# Patient Record
Sex: FEMALE | Race: WHITE
[De-identification: names, ages, dates, MRNs, and addresses within clinical notes are randomized per-mention and may not be internally consistent; named-entity substitution may affect disease eponyms.]

---

## 2017-06-11 ENCOUNTER — HOSPITAL ENCOUNTER (EMERGENCY)
Dept: HOSPITAL 10 - FTE | Age: 52
Discharge: HOME | End: 2017-06-11
Payer: MEDICARE

## 2017-06-11 VITALS
SYSTOLIC BLOOD PRESSURE: 122 MMHG | TEMPERATURE: 98 F | DIASTOLIC BLOOD PRESSURE: 73 MMHG | HEART RATE: 108 BPM | RESPIRATION RATE: 18 BRPM

## 2017-06-11 VITALS — HEIGHT: 55 IN | WEIGHT: 224.47 LBS | BODY MASS INDEX: 51.95 KG/M2

## 2017-06-11 DIAGNOSIS — R07.9: ICD-10-CM

## 2017-06-11 DIAGNOSIS — J45.901: Primary | ICD-10-CM

## 2017-06-11 DIAGNOSIS — J06.9: ICD-10-CM

## 2017-06-11 PROCEDURE — 94664 DEMO&/EVAL PT USE INHALER: CPT

## 2017-06-11 PROCEDURE — 93005 ELECTROCARDIOGRAM TRACING: CPT

## 2017-06-11 PROCEDURE — 99284 EMERGENCY DEPT VISIT MOD MDM: CPT

## 2017-06-11 PROCEDURE — 71010: CPT

## 2017-06-11 NOTE — RADRPT
PROCEDURE:   XR Chest. 

 

CLINICAL INDICATION:    chest pain

 

TECHNIQUE:   Single frontal view of the chest was obtained 

 

COMPARISON:   None 

 

FINDINGS:

The heart and mediastinum are within normal limits.  

The lungs are clear.

There is no pleural effusion or pneumothorax.   

 

RPTAT: AA

 

IMPRESSION:

No acute disease.

_____________________________________________ 

.Eleuterio Garcia MD, MD           Date    Time 

Electronically viewed and signed by .Eleuterio Garcia MD, MD on 06/11/2017 15:47 

 

D:  06/11/2017 15:47  T:  06/11/2017 15:47

.S/

## 2017-06-11 NOTE — ERD
ER Documentation


Chief Complaint


Date/Time


DATE: 17 


TIME: 16:33


Chief Complaint


cough w chest tightness, states "bronchitis"





HPI


Patient is a 51-year-old female with history of asthma who presents to the 

emergency department for concerns of a cough with chest tightness.  Patient 

states her symptoms started yesterday.  Patient states she believes she has 

"bronchitis" because her mother has bronchitis at this time.  Patient states 

when she coughs she feels as if "I am choking".  Patient reports a dry cough 

with occasional clear sputum production. Patient does report some clear 

rhinorrhea.  She reports using her inhaler 1.5 hours prior to arrival.  Patient 

is a smoker.  Patient denies any fevers, chills, nausea, vomiting or LOC.  

Patient denies any chest pain, left upper extremity pain or diaphoresis.  

Patient is able to tolerate p.o. fluids without any difficulty.  Patient's 

granddaughter is also sick contact, she currently has a cold.





ROS


All systems reviewed and are negative except as per history of present illness.





Medications


Home Meds


Active Scripts


Guaifenesin* (Robitussin*) 100 Mg/5 Ml Syrup, 100 MG PO Q4H Y for COUGH, #1 BOT


   Prov:JESSEE CERVANTES PA-C         17


Prednisone* (Prednisone*) 20 Mg Tab, 40 MG PO DAILY for 4 Days, TAB


   Prov:JESSEE CERVANTES PA-C         17


Albuterol Sulfate* (Proair HFA*) 8.5 Gm Hfa.aer.ad, 2 PUFF INH Q4, #1 INHALER


   Prov:JESSEE CERVANTES PA-C         17





Allergies


Allergies:  


Coded Allergies:  


     No Known Allergy (Unverified , 17)





PMhx/Soc


History of Surgery:  Yes (foot surg)


Anesthesia Reaction:  No


Hx Neurological Disorder:  No


Hx Respiratory Disorders:  Yes (asthma)


Hx Cardiac Disorders:  No


Hx Psychiatric Problems:  No


Hx Miscellaneous Medical Probl:  No


Hx Alcohol Use:  No


Hx Substance Use:  No


Hx Tobacco Use:  No


Smoking Status:  Unknown if ever smoked





Physical Exam


Vitals





Vital Signs








  Date Time  Temp Pulse Resp B/P Pulse Ox O2 Delivery O2 Flow Rate FiO2


 


17 16:40 98.0 108 18 122/73 98 Room Air  


 


17 15:05  110 22  97   21


 


17 14:11 99.8 116 20 117/64 96   








Physical Exam


GENERAL: Well-developed, well-nourished female. Appears in no acute distress.  

Speaking in full sentences.  No abdominal retractions, no nasal flaring noted.


HEAD: Normocephalic, atraumatic. No deformities or ecchymosis. 


EYE: Pupils equal, round, and reactive to light. EOMs intact. No conjunctival 

erythema. No eye discharge. 


ENT: External ear without any masses or tenderness. Auditory canals clear 

bilaterally. TM visualized bilaterally, non-erythematous, non-bulging. Nasal 

mucosa pink with no discharge. Oropharynx is pink without any tonsillar 

erythema or exudates. No uvula deviation. No kissing tonsils. 


NECK: Supple. No meningismus. Normal ROM of the neck.


LUNG: Slight wheezing noted in bilateral lower lobes.


HEART: Regular rate and rhythm. No murmurs, rubs or gallops.


BACK: No midline tenderness. 


EXTREMITES: Equal pulses bilaterally. No peripheral clubbing, cyanosis or 

edema. No unilateral leg swelling.  


NEUROLOGIC: Alert and oriented to person, place and time. Moving all four 

extremities. 5/5 strength in all extremities. Normal speech. Steady gait. 


SKIN: Normal color. Warm and dry. No rashes or lesions.


Results 24 hrs





 Current Medications








 Medications


  (Trade)  Dose


 Ordered  Sig/April


 Route


 PRN Reason  Start Time


 Stop Time Status Last Admin


Dose Admin


 


 Ipratropium


 Bromide


  (Atrovent 0.02%


  (Neb))  0.5 mg  ONCE  STAT


 NEB


   17 14:45


 17 14:47 DC 17 15:05


 


 


 Prednisone


  (Prednisone)  40 mg  ONCE  STAT


 PO


   17 14:45


 17 14:47 DC 17 14:55


 


 


 Levalbuterol


  (Xopenex Neb)  1.25 mg  ONCE  STAT


 INH


   17 14:45


 17 14:48 DC 17 15:05


 











Procedures/MDM


ED COURSE:


The patient was stable throughout ED course. I kept the patient and/or family 

informed of laboratory and diagnostic imaging results throughout the ED course.

  





EKG:


Read by Dr. Edward, attending physician.


EKG shows normal sinus tachycardia at a rate of 110 bpm.


No arrhythmias, acute ST elevations or T wave changes were noted.


 


DIAGNOSTIC IMAGING:


Read by radiologist.


 DIAGNOSTIC IMAGING REPORT





 Patient: MICHELLE CARRERA   : 1965   Age: 51  Sex: F                  

      


 MR #:    [id number]   Acct #:   [id number]    DOS: 17 1445


 Ordering MD: JESSEE CERVANTES PA-C   Location:  [facility]   Room/Bed:                 

                           


 








PROCEDURE:   XR Chest. 


 


CLINICAL INDICATION:    chest pain


 


TECHNIQUE:   Single frontal view of the chest was obtained 


 


COMPARISON:   None 


 


FINDINGS:


The heart and mediastinum are within normal limits.  


The lungs are clear.


There is no pleural effusion or pneumothorax.   


 


RPTAT: AA


 


IMPRESSION:


No acute disease.


_____________________________________________ 


.Eleuterio Garcia MD, MD           Date    Time 


Electronically viewed and signed by .Eleuterio Garcia MD, MD on 2017 15:

47 


 


D:  2017 15:47  T:  2017 15:47


.S/





CC: JESSEE CERVANTES PA-C








MEDICATIONS GIVEN: 


Xopenex, ipratropium breathing treatment.  Prednisone.


Patient tolerated medication well with no adverse reactions. 








MEDICAL DECISION MAKING:


This is a 51-year-old female with a history of asthma presents to the ED with a 

cough 1 day.  She does have sick contacts of her mother and granddaughter.  

Vital signs were reviewed. Patient was afebrile. Patient was not hypoxic. 

Patient was noted to be tachycardic however she did recently receive use her 

albuterol inhaler prior to arrival.  ENT exam was normal.  Lung exam revealed 

wheezing in bilateral lower lobes.  Patient was given a breathing treatment as 

well as prednisone here in the emergency department.  Patient reported 

improvement in breathing upon reexamination.  Patient continued to not display 

any signs of respiratory distress.   EKG showed sinus tachycardia, no ST 

elevations.  Chest x-ray was unremarkable.  Given these findings, the patient's 

presentation is most consistent with asthma exacerbation secondary to viral 

URI. I have a much lower clinical concern for ACS, pericarditis, pneumothorax, 

pleural effusion, pneumonia, meningitis, sinusitis, otitis externa, acute 

otitis media, strep pharyngitis, epiglottitis or peritonsillar abscess. 





PRESCRIPTIONS:


Albuterol, prednisone, Robitussin cough syrup





DISCHARGE:


At this time, patient is stable for discharge and outpatient management. 

Patient was provided with a copy of all imaging studies obtained today.  

Smoking cessation advised. Supportive therapies such as OTC throat lozenges, 

salt water gurgles, popsicles and jello discussed. I have instructed the 

patient to follow-up with his/her primary care physician in 1-2 days. I have 

instructed the patient to promptly return to the ER for any new or worsening 

symptoms including increased pain, swelling, fever, nausea, vomiting, weakness 

or difficulty breathing. The patient and/or family expressed understanding of 

and agreement with this plan. All questions were answered. Home care 

instructions were provided.





Departure


Diagnosis:  


 Primary Impression:  


 Asthma exacerbation


 Additional Impression:  


 Viral URI with cough


Condition:  Stable


Patient Instructions:  Preventing Common Respiratory Infections


Referrals:  


[facility]


YOU HAVE RECEIVED A MEDICAL SCREENING EXAM AND THE RESULTS INDICATE THAT YOU DO 

NOT HAVE A CONDITION THAT REQUIRES URGENT TREATMENT IN THE EMERGENCY DEPARTMENT.





FURTHER EVALUATION AND TREATMENT OF YOUR CONDITION CAN WAIT UNTIL YOU ARE SEEN 

IN YOUR DOCTORS OFFICE WITHIN THE NEXT 1-2 DAYS. IT IS YOUR RESPONSIBILITY TO 

MAKE AN APPOINTMENT FOR [facility]- CARE.





IF YOU HAVE A PRIMARY DOCTOR


--you should call your primary doctor and schedule an appointment





IF YOU DO NOT HAVE A PRIMARY DOCTOR YOU CAN CALL OUR PHYSICIAN REFERRAL HOTLINE 

AT


 [phone] 





IF YOU CAN NOT AFFORD TO SEE A PHYSICIAN YOU CAN CHOSE FROM THE FOLLOWING 

[facility]





[facility] [phone] [address]. [facility] [phone] [address] [facility]. [facility] [phone] [address] [facility] [phone] [address] [facility] [facility] [phone] [address] [facility]. [facility]. [phone] [address] [facility]. [facility]


YOU HAVE RECEIVED A MEDICAL SCREENING EXAM AND THE RESULTS INDICATE THAT YOU DO 

NOT HAVE A CONDITION THAT REQUIRES URGENT TREATMENT IN THE EMERGENCY DEPARTMENT.





FURTHER EVALUATION AND TREATMENT OF YOUR CONDITION CAN WAIT UNTIL YOU ARE SEEN 

IN YOUR DOCTORS OFFICE WITHIN THE NEXT 1-2 DAYS. IT IS YOUR RESPONSIBILITY TO 

MAKE AN APPOINTMENT FOR FOLOW-UP CARE.





IF YOU HAVE A PRIMARY DOCTOR


--you should call your primary doctor and schedule and appointment





IF YOU DO NOT HAVE A PRIMARY DOCTOR YOU CAN CALL OUR PHYSICIAN REFERRAL HOTLINE 

AT [phone].





IF YOU CAN NOT AFFORD TO SEE A PHYSICIAN YOU CAN CHOSE FROM THE FOLLOWING 

[facility] INSTITUTIONS:





[facility]


[address] [place] [address]





[facility]


[address] [place] [address]





[facility]


[address] [place] [address]





Additional Instructions:  


Call your primary care doctor TOMORROW for an appointment during the next 1-2 

days.See the doctor sooner or return here if your condition worsens before your 

appointment time.











JESSEE CERVANTES PA-C 2017 16:41

## 2019-06-29 ENCOUNTER — HOSPITAL ENCOUNTER (EMERGENCY)
Dept: HOSPITAL 91 - FTE | Age: 54
Discharge: HOME | End: 2019-06-29
Payer: MEDICARE

## 2019-06-29 ENCOUNTER — HOSPITAL ENCOUNTER (EMERGENCY)
Dept: HOSPITAL 10 - FTE | Age: 54
Discharge: HOME | End: 2019-06-29
Payer: MEDICARE

## 2019-06-29 VITALS
BODY MASS INDEX: 40.46 KG/M2 | HEIGHT: 64 IN | BODY MASS INDEX: 40.46 KG/M2 | HEIGHT: 64 IN | WEIGHT: 237 LBS | WEIGHT: 237 LBS

## 2019-06-29 VITALS — DIASTOLIC BLOOD PRESSURE: 71 MMHG | RESPIRATION RATE: 18 BRPM | HEART RATE: 105 BPM | SYSTOLIC BLOOD PRESSURE: 127 MMHG

## 2019-06-29 DIAGNOSIS — J45.901: Primary | ICD-10-CM

## 2019-06-29 PROCEDURE — 71045 X-RAY EXAM CHEST 1 VIEW: CPT

## 2019-06-29 PROCEDURE — 96372 THER/PROPH/DIAG INJ SC/IM: CPT

## 2019-06-29 PROCEDURE — 99284 EMERGENCY DEPT VISIT MOD MDM: CPT

## 2019-06-29 PROCEDURE — 94664 DEMO&/EVAL PT USE INHALER: CPT

## 2019-06-29 PROCEDURE — 94644 CONT INHLJ TX 1ST HOUR: CPT

## 2019-06-29 RX ADMIN — IPRATROPIUM BROMIDE 1 MG: 0.5 SOLUTION RESPIRATORY (INHALATION) at 11:14

## 2019-06-29 RX ADMIN — IPRATROPIUM BROMIDE 1 MG: 0.5 SOLUTION RESPIRATORY (INHALATION) at 12:27

## 2019-06-29 RX ADMIN — ALBUTEROL SULFATE 1 MG: 2.5 SOLUTION RESPIRATORY (INHALATION) at 12:27

## 2019-06-29 RX ADMIN — ALBUTEROL SULFATE 1 MG: 2.5 SOLUTION RESPIRATORY (INHALATION) at 11:14

## 2019-06-29 RX ADMIN — DEXAMETHASONE SODIUM PHOSPHATE 1 MG: 10 INJECTION, SOLUTION INTRAMUSCULAR; INTRAVENOUS at 12:07

## 2019-06-29 NOTE — ERD
ER Documentation


Chief Complaint


Chief Complaint





cough and fever x 5 days





HPI


53-year-old female presents ED complaining of persistent cough and fever x5 


days.  She reports that the cough produces phlegm occasionally.  She also 


reports chills and body aches but denies any sick contacts or recent travel.  


Denies any abdominal pain, nausea, vomiting, diarrhea.  She reports a history of


asthma in which she uses an albuterol inhaler as needed.  She also reports 


smoking history she smokes 1 pack/day off and on for 10 years.  She brings note 


from her sister asking for medications and symptoms that the patient is having. 


Course of breath or difficulty breathing.  Reports previous history of this in 


the past when she has came to the ED and received proper treatment.





ROS


All systems reviewed and are negative except as per history of present illness.





Medications


Home Meds


Active Scripts


Ibuprofen* (Motrin*) 600 Mg Tab, 600 MG PO Q8, #30 TAB


   Prov:TESHA MALIK PA-C         6/29/19


Benzonatate* (Tessalon Perle*) 100 Mg Capsule, 100 MG PO TID, #30 CAP


   Prov:EDISONOSITESHA GUY PA-C         6/29/19


Budesonide-Formoterol Fumarate* (Symbicort*) 80-4.5 Mcg Hfa.aer.ad, 2 PUFF 


INHALATION BID, #1 BOTTLE


   Prov:TESHA MALIK PA-C         6/29/19


Albuterol Sulfate* (Ventolin HFA*) 18 Gm Hfa.aer.ad, 2 PUFF INHALATION Q6H, #1 


INHALER


   Prov:TESHA MALIK PA-C         6/29/19


Guaifenesin* (Robitussin*) 100 Mg/5 Ml Syrup, 100 MG PO Q4H PRN for COUGH, #1 


BOT


   Prov:JESSEE CERVANTES PA-C         6/11/17


Prednisone* (Prednisone*) 20 Mg Tab, 40 MG PO DAILY for 4 Days, TAB


   Prov:JESSEE CERVANTES PA-C         6/11/17


Albuterol Sulfate* (Proair HFA*) 8.5 Gm Hfa.aer.ad, 2 PUFF INH Q4, #1 INHALER


   Prov:JESSEE CERVANTES PA-C         6/11/17





Allergies


Allergies:  


Coded Allergies:  


     No Known Allergy (Unverified , 6/11/17)





PMhx/Soc


History of Surgery:  Yes (foot surg)


Anesthesia Reaction:  No


Hx Neurological Disorder:  No


Hx Respiratory Disorders:  Yes (asthma)


Hx Cardiac Disorders:  No


Hx Psychiatric Problems:  No


Hx Miscellaneous Medical Probl:  No


Hx Alcohol Use:  No


Hx Substance Use:  No


Hx Tobacco Use:  No


Smoking Status:  Never smoker





FmHx


Family History:  No diabetes





Physical Exam


Vitals





Vital Signs


  Date      Temp  Pulse  Resp  B/P (MAP)   Pulse Ox  O2          O2 Flow    FiO2


Time                                                 Delivery    Rate


   6/29/19          113    18                    90


     12:27


   6/29/19          109    18                    90


     11:09


   6/29/19  98.7    105    18      127/71        91


     10:31                           (89)





Physical Exam


Const:   No acute distress, pt smells of cigarette smoke


Head:   Atraumatic 


Eyes:    Normal Conjunctiva


ENT:    Normal External Ears, Nose and Mouth.


Neck:               Full range of motion. No meningismus.


Resp:   Moderate wheezing throughout the lungs bilat


Cardio:   Regular rate and rhythm,


Abd:    Soft, non tender, non distended. Normal bowel sounds


Skin:   No petechiae or rashes


Back:   No midline or flank tenderness


Ext:    No cyanosis, or edema


Neur:   Awake and alert


Psych:    Normal Mood and Affect


Results 24 hrs





Current Medications


 Medications
   Dose
          Sig/April
       Start Time
   Status  Last


 (Trade)       Ordered        Route
 PRN     Stop Time              Admin
Dose


                              Reason                                Admin


 Albuterol
     2.5 mg         ONCE  STAT
    6/29/19       DC           6/29/19


(Proventil
                   NEB
           11:00
                       11:14



0.083% (Neb))                                6/29/19 11:02


 Ipratropium
   1.5 mg         ONCE  STAT
    6/29/19       DC           6/29/19


Bromide
                      NEB
           11:00
                       11:14



(Atrovent                                    6/29/19 11:02


0.02%



(Neb))


 Albuterol
     10 mg          ONCE  STAT
    6/29/19       DC           6/29/19


(Proventil                    INH
           12:01
                       12:27



0.5%
  (Neb))                                6/29/19 12:04


 Ipratropium
   1 mg           ONCE  STAT
    6/29/19       DC           6/29/19


Bromide
                      INH
           12:01
                       12:27



(Atrovent                                    6/29/19 12:05


0.02%



(Neb))


                10 mg          ONCE  STAT
    6/29/19       DC           6/29/19


Dexamethasone                 IM
            12:01
                       12:07




  (Decadron)                                6/29/19 12:05








Procedures/MDM


ED COURSE:


The patient was stable throughout ED course. I kept the patient informed of 


laboratory and diagnostic imaging results throughout the ED course.  





DIAGNOSTIC IMAGING:


Read by radiologist.


                                        


PROCEDURE:   XR Chest. 


 


CLINICAL INDICATION:  Asthma


 


TECHNIQUE:   Frontal chest x-ray was obtained. 


 


COMPARISON:   Chest x-ray June 11, 2017 


 


FINDINGS:


The heart is not enlarged. Mediastinum is not widened. No hilar masses seen. 


Lungs are clear of any infiltrates. There is no effusion or pneumothorax. The 


osseous structures appear normal.


 


IMPRESSION:


No evidence for active cardiopulmonary disease.


_____________________________________________ 


.Ashish Orozco MD, MD           Date    Time 


Electronically viewed and signed by .Ashish Orozco MD, MD on 06/29/2019 


12:30 








PROCEDURES: Breathing tx: respiratory consulted











MEDICATIONS GIVEN: 


Albuterol, ipratropium, Decadron


Patient tolerated medication well with no adverse reactions. Patient reported 


improvement in pain. 











MEDICAL DECISION MAKING:


Patient is a 53-year-old female complaining of cough and fever x5 days.  Patient


is a chronic smoker in which she smokes 1 pack/day off and on for about 10 


years.  Patient's O2 sat was around 91 and a breathing treatment was initiated 


consulting respiratory.  A course of albuterol ipratropium was given which only 


improved the symptoms slightly.  Longer course of albuterol and Decadron was 


given to the patient which improved her wheezing significantly and states that 


she can breathe much better afterwards.  Chest x-ray was done to rule out any 


infection which was unremarkable.  History and Physical along with other data 


not c/w emergent process including pneumonia, PE, abscess, pleural effusion or 


pneumothorax.  Patient was counseled on smoking cessation along in 3 minutes, 


and discharged with a new prescription for albuterol, Symbicort, Tessalon 


Perles, Motrin and told to follow-up with her primary care provider in the next 


1 to 2 days.  Vital signs were reviewed. Patient is afebrile. Patient was not 


hypoxic. Patient was hemodynamically stable. 








PRESCRIPTION: Albuterol, Symbicort, Tessalon Perles, Motrin





DISCHARGE:


At this time, patient is stable for discharge and outpatient management. I have 


instructed the patient to follow-up with his/her primary care physician in 1-2 


days. I have discussed with the patient the possibility of needing to see a 


specialist for further workup and imaging studies if symptoms persist. I have 


instructed the patient to promptly return to the ER for any new or worsening 


symptoms including increased pain, fever, nausea, vomiting, weakness or LOC. The


patient and/or family expressed understanding of and agreement with this plan. 


All questions were answered. Home care instructions were provided. 





Disclaimer: Inadvertent spelling and grammatical errors are likely due to EHR


/dictation software use and do not reflect on the overall quality of patient 


care. Also, please note that the electronic time recorded on this note does not 


necessarily reflect the actual time of the patient encounter.





Departure


Diagnosis:  


   Primary Impression:  


   Asthma exacerbation


   Asthma severity:  moderate  Asthma persistence:  unspecified  Qualified 


   Codes:  J45.901 - Unspecified asthma with (acute) exacerbation


Condition:  Fair


Patient Instructions:  Asthma, Acute (Adult)


Referrals:  


[facility]


YOU HAVE RECEIVED A MEDICAL SCREENING EXAM AND THE RESULTS INDICATE THAT YOU DO 


NOT HAVE A CONDITION THAT REQUIRES URGENT TREATMENT IN THE EMERGENCY DEPARTMENT.





FURTHER EVALUATION AND TREATMENT OF YOUR CONDITION CAN WAIT UNTIL YOU ARE SEEN 


IN YOUR DOCTORS OFFICE WITHIN THE NEXT 1-2 DAYS. IT IS YOUR RESPONSIBILITY TO 


MAKE AN APPOINTMENT FOR FOLOW-UP CARE.





IF YOU HAVE A PRIMARY DOCTOR


--you should call your primary doctor and schedule an appointment





IF YOU DO NOT HAVE A PRIMARY DOCTOR YOU CAN CALL OUR PHYSICIAN REFERRAL HOTLINE 


AT


 [phone] 





IF YOU CAN NOT AFFORD TO SEE A PHYSICIAN YOU CAN CHOSE FROM THE FOLLOWING 


[facility]





[facility] [phone] [address] [facility] [phone] [address] [facility] [phone] [address] [facility] [phone] [address] [facility] [phone] [address] [facility]. [facility]. [phone] [address] [facility]. [facility]


YOU HAVE RECEIVED A MEDICAL SCREENING EXAM AND THE RESULTS INDICATE THAT YOU DO 


NOT HAVE A CONDITION THAT REQUIRES URGENT TREATMENT IN THE EMERGENCY DEPARTMENT.





FURTHER EVALUATION AND TREATMENT OF YOUR CONDITION CAN WAIT UNTIL YOU ARE SEEN 


IN YOUR DOCTORS OFFICE WITHIN THE NEXT 1-2 DAYS. IT IS YOUR RESPONSIBILITY TO 


MAKE AN APPOINTMENT FOR FOLOW-UP CARE.





IF YOU HAVE A PRIMARY DOCTOR


--you should call your primary doctor and schedule and appointment





IF YOU DO NOT HAVE A PRIMARY DOCTOR YOU CAN CALL OUR PHYSICIAN REFERRAL HOTLINE 


AT [phone].





IF YOU CAN NOT AFFORD TO SEE A PHYSICIAN YOU CAN CHOSE FROM THE FOLLOWING [facility]


INSTITUTIONS:





[facility]


[address] [place] [address]





[facility]


[address] [place] [address]





[facility]


[address] [place] [address]





Additional Instructions:  


Focusing on stopping to smoke! Use your inhalers as directed and follow up with 


primary care provider





Call your primary care doctor TOMORROW for an appointment during the next 1-2 


days.See the doctor sooner or return here if your condition worsens before your 


appointment time.











TESHA MALIK PA-C           Jun 29, 2019 13:24

## 2019-07-01 ENCOUNTER — HOSPITAL ENCOUNTER (INPATIENT)
Dept: HOSPITAL 54 - ER | Age: 54
LOS: 3 days | Discharge: HOME | DRG: 202 | End: 2019-07-04
Attending: INTERNAL MEDICINE | Admitting: NURSE PRACTITIONER
Payer: MEDICARE

## 2019-07-01 VITALS — DIASTOLIC BLOOD PRESSURE: 72 MMHG | SYSTOLIC BLOOD PRESSURE: 132 MMHG

## 2019-07-01 VITALS — DIASTOLIC BLOOD PRESSURE: 72 MMHG | SYSTOLIC BLOOD PRESSURE: 141 MMHG

## 2019-07-01 VITALS — DIASTOLIC BLOOD PRESSURE: 65 MMHG | SYSTOLIC BLOOD PRESSURE: 105 MMHG

## 2019-07-01 VITALS — DIASTOLIC BLOOD PRESSURE: 85 MMHG | SYSTOLIC BLOOD PRESSURE: 141 MMHG

## 2019-07-01 VITALS — WEIGHT: 245 LBS | BODY MASS INDEX: 41.83 KG/M2 | HEIGHT: 64 IN

## 2019-07-01 DIAGNOSIS — D72.829: ICD-10-CM

## 2019-07-01 DIAGNOSIS — Z87.891: ICD-10-CM

## 2019-07-01 DIAGNOSIS — F31.9: ICD-10-CM

## 2019-07-01 DIAGNOSIS — F41.9: ICD-10-CM

## 2019-07-01 DIAGNOSIS — J18.9: ICD-10-CM

## 2019-07-01 DIAGNOSIS — E66.9: ICD-10-CM

## 2019-07-01 DIAGNOSIS — J45.901: Primary | ICD-10-CM

## 2019-07-01 LAB
ALBUMIN SERPL BCP-MCNC: 3.4 G/DL (ref 3.4–5)
ALP SERPL-CCNC: 104 U/L (ref 46–116)
ALT SERPL W P-5'-P-CCNC: 19 U/L (ref 12–78)
AST SERPL W P-5'-P-CCNC: 10 U/L (ref 15–37)
BASOPHILS # BLD AUTO: 0.1 /CMM (ref 0–0.2)
BASOPHILS NFR BLD AUTO: 0.4 % (ref 0–2)
BILIRUB DIRECT SERPL-MCNC: 0.1 MG/DL (ref 0–0.2)
BILIRUB SERPL-MCNC: 0.2 MG/DL (ref 0.2–1)
BUN SERPL-MCNC: 13 MG/DL (ref 7–18)
CALCIUM SERPL-MCNC: 8.8 MG/DL (ref 8.5–10.1)
CHLORIDE SERPL-SCNC: 106 MMOL/L (ref 98–107)
CO2 SERPL-SCNC: 26 MMOL/L (ref 21–32)
CREAT SERPL-MCNC: 0.9 MG/DL (ref 0.6–1.3)
EOSINOPHIL NFR BLD AUTO: 0.4 % (ref 0–6)
GLUCOSE SERPL-MCNC: 127 MG/DL (ref 74–106)
HCT VFR BLD AUTO: 45 % (ref 33–45)
HGB BLD-MCNC: 15.1 G/DL (ref 11.5–14.8)
LYMPHOCYTES NFR BLD AUTO: 15.3 % (ref 20–44)
LYMPHOCYTES NFR BLD AUTO: 3 /CMM (ref 0.8–4.8)
MCHC RBC AUTO-ENTMCNC: 33 G/DL (ref 31–36)
MCV RBC AUTO: 95 FL (ref 82–100)
MONOCYTES NFR BLD AUTO: 1.2 /CMM (ref 0.1–1.3)
MONOCYTES NFR BLD AUTO: 6.3 % (ref 2–12)
NEUTROPHILS # BLD AUTO: 15.2 /CMM (ref 1.8–8.9)
NEUTROPHILS NFR BLD AUTO: 77.6 % (ref 43–81)
PH UR STRIP: 6 [PH] (ref 5–8)
PLATELET # BLD AUTO: 222 /CMM (ref 150–450)
POTASSIUM SERPL-SCNC: 3.7 MMOL/L (ref 3.5–5.1)
PROT SERPL-MCNC: 6.8 G/DL (ref 6.4–8.2)
RBC # BLD AUTO: 4.78 MIL/UL (ref 4–5.2)
SODIUM SERPL-SCNC: 145 MMOL/L (ref 136–145)
UROBILINOGEN UR STRIP-MCNC: 0.2 EU/DL
WBC NRBC COR # BLD AUTO: 19.6 K/UL (ref 4.3–11)

## 2019-07-01 PROCEDURE — G0378 HOSPITAL OBSERVATION PER HR: HCPCS

## 2019-07-01 RX ADMIN — Medication SCH MG: at 14:40

## 2019-07-01 RX ADMIN — ALBUTEROL SULFATE SCH MG: 2.5 SOLUTION RESPIRATORY (INHALATION) at 07:35

## 2019-07-01 RX ADMIN — ALBUTEROL SULFATE SCH MG: 2.5 SOLUTION RESPIRATORY (INHALATION) at 19:07

## 2019-07-01 RX ADMIN — ALBUTEROL SULFATE SCH MG: 2.5 SOLUTION RESPIRATORY (INHALATION) at 14:40

## 2019-07-01 RX ADMIN — GUAIFENESIN SCH MG: 600 TABLET, EXTENDED RELEASE ORAL at 09:21

## 2019-07-01 RX ADMIN — Medication SCH MG: at 19:07

## 2019-07-01 RX ADMIN — GUAIFENESIN SCH MG: 600 TABLET, EXTENDED RELEASE ORAL at 21:16

## 2019-07-01 RX ADMIN — DEXTROSE MONOHYDRATE SCH MLS/HR: 50 INJECTION, SOLUTION INTRAVENOUS at 10:36

## 2019-07-01 RX ADMIN — Medication SCH EACH: at 17:57

## 2019-07-01 RX ADMIN — METHIMAZOLE SCH MG: 5 TABLET ORAL at 21:18

## 2019-07-01 RX ADMIN — DEXTROSE MONOHYDRATE SCH MLS/HR: 50 INJECTION, SOLUTION INTRAVENOUS at 09:21

## 2019-07-01 RX ADMIN — Medication SCH MG: at 07:35

## 2019-07-01 RX ADMIN — ALBUTEROL SULFATE SCH MG: 2.5 SOLUTION RESPIRATORY (INHALATION) at 09:09

## 2019-07-01 RX ADMIN — Medication SCH MG: at 09:09

## 2019-07-01 NOTE — NUR
RN TELE NOTES



PATIENT SEATING ON BED, BREATHING EVEN AND UNLABORED,  A/O X4 NO S/S OF SOB/ RESPIRATORY 
DISTRESS ON 6 LT NASAL CANNULA SATURATING 93-96 % ,  NO C/O ACUTE PAIN OR DISCOMFORT AT THIS 
TIME, NO BEHAVIORAL CHANGES NOTED,  PIV LAC S/L # 18G, PATENT AND INTACT, ALL SAFETY AND 
FALL PRECAUTIONS IN PLACE.  BED  LOCKED AND LOW POSITION, CALL LIGHT WITHIN REACH, WILL 
CONTINUE TO MONITOR CLOSELY.

## 2019-07-01 NOTE — NUR
PT BIBRA C/O SOB. PT 91% ON ROOM AIR, PLACED ON 4L NC, O2 SAT 94%. DENIES 
COUGH, CHEST PAIN, N/V/D. PT AAOX4. NOTED TACHYCARDIA, MD AWARE. SKIN WARM AND 
INTACT. NO ACUTE DISTRESS NOTED AT THIS TIME. PLACED ON CONTINUOUS CARDIAC 
MONITOR, WILL CONTINUE TO MONITOR

## 2019-07-01 NOTE — NUR
PT REC'D 3 BREATHING TREATMENT, REMAINS DESATURATING ON ROOM AIR. PLACED ON 
SIMPLE MASK 6L, O2 SAT 95%

## 2019-07-01 NOTE — NUR
RN TELE ADMITTING NOTES



RECEIVED PATIENT FROM ED VIA RNEY ON 6 LTRS MASK. PATIENT A/O X4 NO SIGSN OR SYMPTOMS OF 
RESPIRATING DISTRESS NOTED SOB WHILE AMBULATORY AND COUGH WITH SMALL YELLOW SPUTUM. NO C/O 
PAIN .VITAL SIGNS T 98.4  B/P 141/72 RR 18 SAT 94% ON 6 LTRS. AMBULATORY WITHOUT 
ASSISTANCE REFUSING PHOTOS SKIN INTACT. IV TO LAC #18 GAUGE. BREAKFAST TRAY BROUGHT AND 
PATIENT APPETITE GOOD. IV ATB RUNNING NO ADVERSE RXN NOTED. ORIENTATED TO ROOM AND CALL 
SYSTEM SAFETY PRECAUTIONS IN PLACE BED IN LOW POSITION CALL LIGHT WITHIN REACH. WILL MONITOR 
ACCORDINGLY

## 2019-07-01 NOTE — NUR
RN TELE NOTES



NO SIGNIFICANT CHANGES THROUGHOUT THE DAY. PATIENT A/O X4 NO SIGNS OR SYMPTOMS OF 
RESPIRATORY DISTRESS ON 6 LTRS NASAL CANNULA SATURATING 94 % . NO S/O ACUTE PAIN. ATB GIVEN 
AS ORDERED NO ADVERSE RXN NOTED. AMBULATORY IN ROOM AND TO BATHROOM.APPETITE GOOD NO N/V/D  
NO EPISODES OF MANIC BIPOLAR NOTED. PATIENT SLEEPING MOST OF DAY. IV SALINE LOCK TO LAC # 18 
GAUGE PATENT AND GOOD BLOOD RETURN. SAFETY AND FALL PRECAUTIONS IN PLACE BED IN MOW LOCKED 
POSITION. ABLE TO MAKE NEEDS KNOWN AND ALL MET BY STAFF. CALL LIGHT WITHIN REACH

## 2019-07-01 NOTE — NUR
IV INITIATED LAC 18G. LABS DRAWN FROM SITE. PHLEBOTOMIST AT BEDSIDE FOR 
COLLECTION. IV INTACT AND PATENT.

## 2019-07-02 VITALS — SYSTOLIC BLOOD PRESSURE: 141 MMHG | DIASTOLIC BLOOD PRESSURE: 77 MMHG

## 2019-07-02 VITALS — DIASTOLIC BLOOD PRESSURE: 51 MMHG | SYSTOLIC BLOOD PRESSURE: 101 MMHG

## 2019-07-02 VITALS — SYSTOLIC BLOOD PRESSURE: 138 MMHG | DIASTOLIC BLOOD PRESSURE: 82 MMHG

## 2019-07-02 VITALS — SYSTOLIC BLOOD PRESSURE: 114 MMHG | DIASTOLIC BLOOD PRESSURE: 53 MMHG

## 2019-07-02 VITALS — SYSTOLIC BLOOD PRESSURE: 119 MMHG | DIASTOLIC BLOOD PRESSURE: 77 MMHG

## 2019-07-02 VITALS — SYSTOLIC BLOOD PRESSURE: 90 MMHG | DIASTOLIC BLOOD PRESSURE: 45 MMHG

## 2019-07-02 LAB
ALBUMIN SERPL BCP-MCNC: 3 G/DL (ref 3.4–5)
ALP SERPL-CCNC: 90 U/L (ref 46–116)
ALT SERPL W P-5'-P-CCNC: 21 U/L (ref 12–78)
AST SERPL W P-5'-P-CCNC: 13 U/L (ref 15–37)
BASOPHILS # BLD AUTO: 0 /CMM (ref 0–0.2)
BASOPHILS NFR BLD AUTO: 0.1 % (ref 0–2)
BILIRUB SERPL-MCNC: 0.1 MG/DL (ref 0.2–1)
BUN SERPL-MCNC: 15 MG/DL (ref 7–18)
CALCIUM SERPL-MCNC: 8.8 MG/DL (ref 8.5–10.1)
CHLORIDE SERPL-SCNC: 109 MMOL/L (ref 98–107)
CO2 SERPL-SCNC: 27 MMOL/L (ref 21–32)
CREAT SERPL-MCNC: 0.8 MG/DL (ref 0.6–1.3)
EOSINOPHIL NFR BLD AUTO: 0 % (ref 0–6)
GLUCOSE SERPL-MCNC: 180 MG/DL (ref 74–106)
HCT VFR BLD AUTO: 41 % (ref 33–45)
HGB BLD-MCNC: 13.6 G/DL (ref 11.5–14.8)
LYMPHOCYTES NFR BLD AUTO: 1.6 /CMM (ref 0.8–4.8)
LYMPHOCYTES NFR BLD AUTO: 10.6 % (ref 20–44)
MAGNESIUM SERPL-MCNC: 2.2 MG/DL (ref 1.8–2.4)
MCHC RBC AUTO-ENTMCNC: 33 G/DL (ref 31–36)
MCV RBC AUTO: 95 FL (ref 82–100)
MONOCYTES NFR BLD AUTO: 0.8 /CMM (ref 0.1–1.3)
MONOCYTES NFR BLD AUTO: 5.5 % (ref 2–12)
NEUTROPHILS # BLD AUTO: 12.6 /CMM (ref 1.8–8.9)
NEUTROPHILS NFR BLD AUTO: 83.8 % (ref 43–81)
PHOSPHATE SERPL-MCNC: 3.7 MG/DL (ref 2.5–4.9)
PLATELET # BLD AUTO: 197 /CMM (ref 150–450)
POTASSIUM SERPL-SCNC: 4.6 MMOL/L (ref 3.5–5.1)
PROT SERPL-MCNC: 6.2 G/DL (ref 6.4–8.2)
RBC # BLD AUTO: 4.28 MIL/UL (ref 4–5.2)
SODIUM SERPL-SCNC: 145 MMOL/L (ref 136–145)
WBC NRBC COR # BLD AUTO: 15 K/UL (ref 4.3–11)

## 2019-07-02 RX ADMIN — GUAIFENESIN SCH MG: 600 TABLET, EXTENDED RELEASE ORAL at 08:17

## 2019-07-02 RX ADMIN — DEXTROSE MONOHYDRATE SCH MLS/HR: 50 INJECTION, SOLUTION INTRAVENOUS at 08:20

## 2019-07-02 RX ADMIN — Medication SCH MG: at 00:41

## 2019-07-02 RX ADMIN — ALBUTEROL SULFATE SCH MG: 2.5 SOLUTION RESPIRATORY (INHALATION) at 08:11

## 2019-07-02 RX ADMIN — METHIMAZOLE SCH MG: 5 TABLET ORAL at 21:39

## 2019-07-02 RX ADMIN — Medication SCH MG: at 19:36

## 2019-07-02 RX ADMIN — Medication SCH EACH: at 08:17

## 2019-07-02 RX ADMIN — Medication SCH EACH: at 17:46

## 2019-07-02 RX ADMIN — Medication SCH MG: at 13:04

## 2019-07-02 RX ADMIN — ALBUTEROL SULFATE SCH MG: 2.5 SOLUTION RESPIRATORY (INHALATION) at 19:36

## 2019-07-02 RX ADMIN — Medication SCH MG: at 08:11

## 2019-07-02 RX ADMIN — GUAIFENESIN SCH MG: 600 TABLET, EXTENDED RELEASE ORAL at 21:39

## 2019-07-02 RX ADMIN — ALBUTEROL SULFATE SCH MG: 2.5 SOLUTION RESPIRATORY (INHALATION) at 00:40

## 2019-07-02 RX ADMIN — ALBUTEROL SULFATE SCH MG: 2.5 SOLUTION RESPIRATORY (INHALATION) at 13:04

## 2019-07-02 RX ADMIN — DEXTROSE MONOHYDRATE SCH MLS/HR: 50 INJECTION, SOLUTION INTRAVENOUS at 09:45

## 2019-07-02 NOTE — NUR
TELE RN OPENING NOTES

RECEIVED REPORT FROM CLEMENCIA NÚÑEZ. PATIENT A/A/O X4. BREATHING EVEN & UNLABORED, TOLERATING ROOM 
AIR @ THIS TIME BUT PREFERS O2 @ 6LPM VIA SIMPLE MASK AS NEEDED. DENIES ANY SOB OR 
DIFFICULTY BREATHING. ON TELE W/ SINUS RHYTHM, HR 90S. LEFT ACIV #20 INTACT & PATENT W/ 
DRESSING CDI, SALINE LOCKED. DENIES ANY PAIN OR DISCOMFORT @ THIS TIME. SAFETY MEASURES IN 
PLACE W/ SIDERAILS UP CALL LIGHT WITHIN REACH. ABLE TO AMBULATE INDEPENDENTLY BUT INSTRUCTED 
TO CALL FOR ASSISTANCE. WILL CONTINUE TO MONITOR.

## 2019-07-02 NOTE — NUR
TELE1/RN     AM SHIFT INITIAL NOTES



RECEIVED PT ASLEEP IN BED, EASILY AROUSED, PT A/O X 4, DENIES HAVING RESPIRATORY DISTRESS, 
BUT NOTED LABORED BREATHING UPON EXERTION, ON 6L O2 VIA SIMPLE MASK, SATURATING @ 94%, LUNG 
SOUNDS DIMINISHED. ON TELE WITH SINUS RHYTHM, HR 89. IV SITE FLUSHED, PATENT WITH NO S/S OF 
INFECTION, SL. PT IS COMFORTABLE, SCHEDULED AM MEDS TO BE GIVEN. CL WITHIN REACHED AND 
SAFETY MAINTAINED. ON GOING MONITORING.

## 2019-07-02 NOTE — NUR
TELE1/RN     AM SHIFT END NOTES



ALL NEEDS MET. NO ACUTE CHANGE OF CONDITION NOTED DURING THE SHIFT. NOTED LABORED BREATHING 
DURING THE SHIFT ON EXERTION. PT ENDORSED TO PM NURSE TO CONTINUE CARE. CL WITHIN REACHED 
AND SAFETY MAINTAINED.

## 2019-07-02 NOTE — NUR
RN TELE NOTES



PATIENT SLEEPING IN BED, BUT EASILY AROUSABLE TO TACTILE STIMULI,  BREATHING EVEN AND 
UNLABORED,   NO S/S OF SOB/ RESPIRATORY DISTRESS , CONT  6L SIMPLE MASK  SATURATING 93-96 % 
,  NO C/O ACUTE PAIN OR DISCOMFORT AT THIS TIME, NO BEHAVIORAL CHANGES NOTED,  PIV LAC S/L # 
18G, PATENT AND INTACT, ALL SAFETY AND FALL PRECAUTIONS IN PLACE, NO SIGNIFICANT CHANGE IN 
CONDITION DURING THE NIGHT,  BED  LOCKED AND LOW POSITION, CALL LIGHT WITHIN REACH, WILL 
ENDORSE TO ONCOMING NURSE FOR CONTINUITY OF CARE.

## 2019-07-02 NOTE — NUR
TELE1/RN     NOON ROUNDS



PT NOTED STILL HAVING LABORED BREATHING ON EXERTION.  MONITORING CONTINUED.

## 2019-07-03 VITALS — SYSTOLIC BLOOD PRESSURE: 108 MMHG | DIASTOLIC BLOOD PRESSURE: 55 MMHG

## 2019-07-03 VITALS — SYSTOLIC BLOOD PRESSURE: 138 MMHG | DIASTOLIC BLOOD PRESSURE: 87 MMHG

## 2019-07-03 VITALS — SYSTOLIC BLOOD PRESSURE: 140 MMHG | DIASTOLIC BLOOD PRESSURE: 80 MMHG

## 2019-07-03 VITALS — DIASTOLIC BLOOD PRESSURE: 58 MMHG | SYSTOLIC BLOOD PRESSURE: 102 MMHG

## 2019-07-03 VITALS — DIASTOLIC BLOOD PRESSURE: 89 MMHG | SYSTOLIC BLOOD PRESSURE: 117 MMHG

## 2019-07-03 LAB
BASOPHILS # BLD AUTO: 0 /CMM (ref 0–0.2)
BASOPHILS NFR BLD AUTO: 0.1 % (ref 0–2)
BUN SERPL-MCNC: 19 MG/DL (ref 7–18)
CALCIUM SERPL-MCNC: 8.8 MG/DL (ref 8.5–10.1)
CHLORIDE SERPL-SCNC: 105 MMOL/L (ref 98–107)
CO2 SERPL-SCNC: 29 MMOL/L (ref 21–32)
CREAT SERPL-MCNC: 0.8 MG/DL (ref 0.6–1.3)
EOSINOPHIL NFR BLD AUTO: 0 % (ref 0–6)
GLUCOSE SERPL-MCNC: 159 MG/DL (ref 74–106)
HCT VFR BLD AUTO: 43 % (ref 33–45)
HGB BLD-MCNC: 14.3 G/DL (ref 11.5–14.8)
LYMPHOCYTES NFR BLD AUTO: 1.6 /CMM (ref 0.8–4.8)
LYMPHOCYTES NFR BLD AUTO: 11.3 % (ref 20–44)
MAGNESIUM SERPL-MCNC: 2.2 MG/DL (ref 1.8–2.4)
MCHC RBC AUTO-ENTMCNC: 34 G/DL (ref 31–36)
MCV RBC AUTO: 96 FL (ref 82–100)
MONOCYTES NFR BLD AUTO: 0.8 /CMM (ref 0.1–1.3)
MONOCYTES NFR BLD AUTO: 5.8 % (ref 2–12)
NEUTROPHILS # BLD AUTO: 11.8 /CMM (ref 1.8–8.9)
NEUTROPHILS NFR BLD AUTO: 82.8 % (ref 43–81)
PHOSPHATE SERPL-MCNC: 4.2 MG/DL (ref 2.5–4.9)
PLATELET # BLD AUTO: 196 /CMM (ref 150–450)
POTASSIUM SERPL-SCNC: 4.6 MMOL/L (ref 3.5–5.1)
RBC # BLD AUTO: 4.45 MIL/UL (ref 4–5.2)
SODIUM SERPL-SCNC: 141 MMOL/L (ref 136–145)
WBC NRBC COR # BLD AUTO: 14.3 K/UL (ref 4.3–11)

## 2019-07-03 RX ADMIN — Medication SCH MG: at 07:32

## 2019-07-03 RX ADMIN — Medication SCH MG: at 01:40

## 2019-07-03 RX ADMIN — DEXTROSE MONOHYDRATE SCH MLS/HR: 50 INJECTION, SOLUTION INTRAVENOUS at 10:08

## 2019-07-03 RX ADMIN — GUAIFENESIN SCH MG: 600 TABLET, EXTENDED RELEASE ORAL at 10:08

## 2019-07-03 RX ADMIN — Medication SCH EACH: at 10:08

## 2019-07-03 RX ADMIN — Medication SCH EACH: at 16:41

## 2019-07-03 RX ADMIN — Medication SCH MG: at 13:24

## 2019-07-03 RX ADMIN — DEXTROSE MONOHYDRATE SCH MLS/HR: 50 INJECTION, SOLUTION INTRAVENOUS at 11:11

## 2019-07-03 RX ADMIN — ALBUTEROL SULFATE SCH MG: 2.5 SOLUTION RESPIRATORY (INHALATION) at 07:32

## 2019-07-03 RX ADMIN — ALBUTEROL SULFATE SCH MG: 2.5 SOLUTION RESPIRATORY (INHALATION) at 13:24

## 2019-07-03 RX ADMIN — GUAIFENESIN SCH MG: 600 TABLET, EXTENDED RELEASE ORAL at 21:38

## 2019-07-03 RX ADMIN — ALBUTEROL SULFATE SCH MG: 2.5 SOLUTION RESPIRATORY (INHALATION) at 01:40

## 2019-07-03 RX ADMIN — Medication SCH MG: at 19:54

## 2019-07-03 RX ADMIN — METHIMAZOLE SCH MG: 5 TABLET ORAL at 21:38

## 2019-07-03 RX ADMIN — ALBUTEROL SULFATE SCH MG: 2.5 SOLUTION RESPIRATORY (INHALATION) at 19:54

## 2019-07-03 NOTE — NUR
MS RN NOTES



PATIENT SATURATION 88 ON ROOM AIR. PT REQUESTS FOR OXYGEN MASK INSTEAD OF NASAL CANNULA. PUT 
PATIENT ON 6L NC. RECHECKED O2SAT NOW 93%. PT NOT ON ANY DISTRESS. RR EVEN AND UNLABORED. 
DISCUSSED WITH PT TO NOT REMOVE MASK. WILL CONT TO MONITOR.

## 2019-07-03 NOTE — NUR
RN MS CLOSING NOTES

GAVE REPORT TO NIGHT SHIFT RN. PT IS SITTING [place] IN BED WATCHING TV. PT DENIES ANY 
SOB OR PAIN AT PRESENT MOMENT. PER REPORT PT IS ABLE TO AMBULATE TO BATHROOM HOWEVER 
DISCUSSED WITH PT TO USE CALL LIGHT WHEN SHE FEELS THE URGE TO GET UP FOR SAFETY 
PRECAUTIONS. BED IS LOCKED AND IN LOWEST POSITION WILL ENDORSE CONTINUITY OF CARE TO NIGHT 
SHIFT RN.

## 2019-07-03 NOTE — NUR
TELE RN OPENING NOTES\

RECEIVED REPORT FROM NIGHT SHIFT RN. PT IS SITTING [place] IN BED EATING BREAKFAST. PT 
DENIES ANY SOB OR PAIN AT PRESENT MOMENT. ON MONITOR PT HEART RATE IS SR . PER REPORT 
PT IS ABLE TO AMBULATE TO BATHROOM HOWEVER DISCUSSED WITH PT TO USE CALL LIGHT WHEN SHE 
FEELS THE URGE TO GET UP FOR SAFETY PRECAUTIONS. BED IS LOCKED AND IN LOWEST POSITION WILL 
CONTINUE TO MONITOR.

## 2019-07-03 NOTE — NUR
MS RN OPENING NOTES



RECEIVED REPORT FROM NIGHT AM RN. PATIENT IS SITTING ON SIDE OF THE BED, FEET DANGLING. 
PATIENT ON ROOM AIR, PATIENT DENIES ANY SOB OR PAIN AT THIS TIME. RR EVEN AND UNLABORED. PER 
REPORT, PT IS ABLE TO AMBULATE TO BATHROOM, HOWEVER DISCUSSED WITH PT TO USE CALL LIGHT WHEN 
SHE FEELS THE URGE TO GET UP FOR SAFETY PRECAUTIONS. SAFETY MEASURES MAINTAINED; CALL LIGHT 
WITHIN REACH, BED LOCKED AND IN LOWEST POSITION. WILL CONTINUE TO MONITOR.

## 2019-07-04 ENCOUNTER — HOSPITAL ENCOUNTER (EMERGENCY)
Dept: HOSPITAL 54 - ER | Age: 54
Discharge: HOME | End: 2019-07-04
Payer: MEDICAID

## 2019-07-04 VITALS — BODY MASS INDEX: 41.83 KG/M2 | WEIGHT: 245 LBS | HEIGHT: 64 IN

## 2019-07-04 VITALS — DIASTOLIC BLOOD PRESSURE: 85 MMHG | SYSTOLIC BLOOD PRESSURE: 129 MMHG

## 2019-07-04 VITALS — SYSTOLIC BLOOD PRESSURE: 106 MMHG | DIASTOLIC BLOOD PRESSURE: 58 MMHG

## 2019-07-04 VITALS — DIASTOLIC BLOOD PRESSURE: 62 MMHG | SYSTOLIC BLOOD PRESSURE: 128 MMHG

## 2019-07-04 DIAGNOSIS — E66.01: ICD-10-CM

## 2019-07-04 DIAGNOSIS — F17.200: ICD-10-CM

## 2019-07-04 DIAGNOSIS — J44.9: Primary | ICD-10-CM

## 2019-07-04 DIAGNOSIS — D72.829: ICD-10-CM

## 2019-07-04 DIAGNOSIS — Z79.899: ICD-10-CM

## 2019-07-04 DIAGNOSIS — F99: ICD-10-CM

## 2019-07-04 LAB
APAP SERPL-MCNC: 0 UG/ML (ref 10–30)
BASOPHILS # BLD AUTO: 0 /CMM (ref 0–0.2)
BASOPHILS NFR BLD AUTO: 0.1 % (ref 0–2)
BUN SERPL-MCNC: 20 MG/DL (ref 7–18)
CALCIUM SERPL-MCNC: 8.8 MG/DL (ref 8.5–10.1)
CHLORIDE SERPL-SCNC: 105 MMOL/L (ref 98–107)
CO2 SERPL-SCNC: 30 MMOL/L (ref 21–32)
CREAT SERPL-MCNC: 0.9 MG/DL (ref 0.6–1.3)
EOSINOPHIL NFR BLD AUTO: 0 % (ref 0–6)
ETHANOL SERPL-MCNC: < 3 MG/DL (ref 0–0)
GLUCOSE SERPL-MCNC: 150 MG/DL (ref 74–106)
HCT VFR BLD AUTO: 43 % (ref 33–45)
HGB BLD-MCNC: 14.4 G/DL (ref 11.5–14.8)
LYMPHOCYTES NFR BLD AUTO: 1.6 /CMM (ref 0.8–4.8)
LYMPHOCYTES NFR BLD AUTO: 12.9 % (ref 20–44)
MAGNESIUM SERPL-MCNC: 2.3 MG/DL (ref 1.8–2.4)
MCHC RBC AUTO-ENTMCNC: 34 G/DL (ref 31–36)
MCV RBC AUTO: 95 FL (ref 82–100)
MONOCYTES NFR BLD AUTO: 0.7 /CMM (ref 0.1–1.3)
MONOCYTES NFR BLD AUTO: 5.5 % (ref 2–12)
NEUTROPHILS # BLD AUTO: 10.4 /CMM (ref 1.8–8.9)
NEUTROPHILS NFR BLD AUTO: 81.5 % (ref 43–81)
PH UR STRIP: 7 [PH] (ref 5–8)
PHOSPHATE SERPL-MCNC: 4 MG/DL (ref 2.5–4.9)
PLATELET # BLD AUTO: 215 /CMM (ref 150–450)
POTASSIUM SERPL-SCNC: 4.4 MMOL/L (ref 3.5–5.1)
RBC # BLD AUTO: 4.49 MIL/UL (ref 4–5.2)
SALICYLATES SERPL-MCNC: 4.7 MG/DL (ref 2.8–20)
SODIUM SERPL-SCNC: 142 MMOL/L (ref 136–145)
UROBILINOGEN UR STRIP-MCNC: 0.2 EU/DL
WBC NRBC COR # BLD AUTO: 12.8 K/UL (ref 4.3–11)

## 2019-07-04 PROCEDURE — 99406 BEHAV CHNG SMOKING 3-10 MIN: CPT

## 2019-07-04 PROCEDURE — 81001 URINALYSIS AUTO W/SCOPE: CPT

## 2019-07-04 PROCEDURE — 80307 DRUG TEST PRSMV CHEM ANLYZR: CPT

## 2019-07-04 PROCEDURE — 99284 EMERGENCY DEPT VISIT MOD MDM: CPT

## 2019-07-04 PROCEDURE — 94640 AIRWAY INHALATION TREATMENT: CPT

## 2019-07-04 PROCEDURE — 80329 ANALGESICS NON-OPIOID 1 OR 2: CPT

## 2019-07-04 PROCEDURE — 36415 COLL VENOUS BLD VENIPUNCTURE: CPT

## 2019-07-04 PROCEDURE — G0480 DRUG TEST DEF 1-7 CLASSES: HCPCS

## 2019-07-04 PROCEDURE — 80305 DRUG TEST PRSMV DIR OPT OBS: CPT

## 2019-07-04 RX ADMIN — DEXTROSE MONOHYDRATE SCH MLS/HR: 50 INJECTION, SOLUTION INTRAVENOUS at 08:48

## 2019-07-04 RX ADMIN — DEXTROSE MONOHYDRATE SCH MLS/HR: 50 INJECTION, SOLUTION INTRAVENOUS at 09:39

## 2019-07-04 RX ADMIN — GUAIFENESIN SCH MG: 600 TABLET, EXTENDED RELEASE ORAL at 08:47

## 2019-07-04 RX ADMIN — Medication SCH MG: at 01:30

## 2019-07-04 RX ADMIN — ALBUTEROL SULFATE SCH MG: 2.5 SOLUTION RESPIRATORY (INHALATION) at 06:59

## 2019-07-04 RX ADMIN — Medication SCH EACH: at 08:48

## 2019-07-04 RX ADMIN — Medication SCH MG: at 06:59

## 2019-07-04 RX ADMIN — ALBUTEROL SULFATE SCH MG: 2.5 SOLUTION RESPIRATORY (INHALATION) at 01:30

## 2019-07-04 RX ADMIN — Medication SCH EACH: at 16:06

## 2019-07-04 NOTE — NUR
PT AND HER DTR INSISTING ON LEAVING THE HOSPITAL. AND REFUSED TO RECEIVED D/C 
PAPERS AND AFTER CARE INSTRUCTIONS. [facility] MADE AWARE.

## 2019-07-04 NOTE — NUR
CALLED NURSING SUP; PT WAS D/C TODAY FROM [facility], FAMILY WANTING TO GET PT 
RE-ADMITTED TO [facility] D/T LETHARGY AND UNABLE TO F/U WITH D/C MEDS. NURSING SUP 
WILL F/U

## 2019-07-04 NOTE — NUR
PATIENT A/OX1-2, FAMILY AT BEDSIDE, NO DISTRESS NTOED, ON ROOM AIR WITH SPO2 OF 
94%. ATTACHED TO THE MONITOR. WILL MONITOR./

## 2019-07-04 NOTE — NUR
MS RN NOTE 

PATIENT SLEEPING IN BED, BUT EASY TO AROUSE.RESTING COMFORTABLY   TWILA PATIENT CURRENTLY ON 
ROOM AIR, PATIENT DENIES ANY SOB OR PAIN AT THIS TIME. RR EVEN AND UNLABORED. SAFETY 
MEASURES MAINTAINED; CALL LIGHT WITHIN REACH, BED LOCKED AND IN LOWEST POSITION.  ALL NEEDS 
ANTICIPATED AND MET. WILL  CONT TO MONITOR, RT HAND HL INTACT NO S\S INFECTION NOTED

## 2019-07-04 NOTE — NUR
MS NIYA NOTE

 ASSISTED  BR , ABLE TO AMBULATE SELF WITH STAND BY [id number] 

-------------------------------------------------------------------------------

Addendum: 07/04/19 at 1440 by DAVID BAJWA RN

-------------------------------------------------------------------------------

 PER DR KSENIA ZIEGLER TO DISCHARGE HOME ,CHARGE NURSE SPOKE WITH SISTER  SITED STATED THAT WILL 
 SOON, WILL F\U

## 2019-07-04 NOTE — NUR
MS RN NOTE 

 DISCHARGE INSTRUCTION GIVEN , UNDERSTOOD, HL ON RT HAND REMOVED ,NO BLEEDING NOTED , PX 
GIVEN,EMPLANED HOW TAKE NEW AND HOME MEDICATION WITH POSSIBLE SIDE EFFECTS , TAKEN TO LOBBY 
BY WAILING WITH STABLE CONDITION WITH CNA, REBECCA BORRERO, CALLED SISTER TO PICKUP 
PATIENT, STATED THAT WILL AWAIT ON FROM HOSPITAL ON TAXI

## 2019-07-04 NOTE — NUR
MS RN CLOSING NOTES



PATIENT SLEEPING IN BED, BUT EASY TO AROUSE. NO ACUTE CHANGES THROUGHOUT SHIFT. PATIENT 
CURRENTLY ON ROOM AIR, PATIENT DENIES ANY SOB OR PAIN AT THIS TIME. RR EVEN AND UNLABORED. 
SAFETY MEASURES MAINTAINED; CALL LIGHT WITHIN REACH, BED LOCKED AND IN LOWEST POSITION. ALL 
MD ORDERS ATTENDED. ALL NEEDS ANTICIPATED AND MET. WILL ENDORSE TO AM RN FOR JUANITA.

## 2019-07-04 NOTE — NUR
MS RN NOTE 

CHARGE  NURSE TOOK PATIENT TO FAMILY CAR , PATENT WAS ALERT , ORIENTED , WITH STABLE 
CONDITION , NO C\O ANY DISCOMFORT , EXPLAINED AGAIN TO CONT HOME MEDS AS ORDERED ,ASKED 
SISTER OR MOTHER TO COME TO FLOOR TO EXPLAINED  MORE ABOUT DISCHARGE BUT REFUSED

## 2019-07-04 NOTE — NUR
MS RN NOTE 

 RECEIVED CALL FROM FAMILY MOTHER STATED   THAT PATIENT DID NOT HAVE HER LATUDA MED IN 
HOSPITAL  , RECHECKED EMAR WAS NOT ORDERED , ALSO EXPLAINED  TO FAMILY TO CONT AS HOME MEDS 
,STILL VERY UPSET ABOUT THAT WAS NOT GIVEN IN HOSPITAL, PER MED RECON WAS HELD, PER MD 
ORDERED, EXPLAINED TO FAMILY IF PATIENT  STILL NOT FEELING  WELL GO TO CLOSEST ER , , STILL 
VERY UPSET STATED  THAT SHE WILL  NGUYEN NURSING YOU  , REPORTED THAT TO CHARGE NURSE SOON , 
AND CALL TO HOUSE SUPERVISOR SEMAJ , EXPLAINED  ABOUT FAMILY ISSUE,

## 2023-06-07 ENCOUNTER — HOSPITAL ENCOUNTER (EMERGENCY)
Dept: HOSPITAL 54 - ER | Age: 58
Discharge: HOME | End: 2023-06-07
Payer: MEDICARE

## 2023-06-07 VITALS — DIASTOLIC BLOOD PRESSURE: 88 MMHG | SYSTOLIC BLOOD PRESSURE: 135 MMHG

## 2023-06-07 VITALS — WEIGHT: 240 LBS | BODY MASS INDEX: 40.97 KG/M2 | HEIGHT: 64 IN

## 2023-06-07 DIAGNOSIS — Z79.899: ICD-10-CM

## 2023-06-07 DIAGNOSIS — J45.909: ICD-10-CM

## 2023-06-07 DIAGNOSIS — F41.9: Primary | ICD-10-CM

## 2023-06-07 DIAGNOSIS — Z60.2: ICD-10-CM

## 2023-06-07 SDOH — SOCIAL STABILITY - SOCIAL INSECURITY: PROBLEMS RELATED TO LIVING ALONE: Z60.2
